# Patient Record
(demographics unavailable — no encounter records)

---

## 2025-04-14 NOTE — CONSULT LETTER
[Sincerely,] : Sincerely, [FreeTextEntry1] : Dear Dr. Chaparro,  I saw your patient Ryan Helm with his mother in the office today. He is about 3 weeks status post single port laparoscopic appendectomy for acute appendicitis.  He has done well postoperatively.  He is eating well and having normal bowel movements.  He has no fever or abdominal pain.  On examination today, Ryan appears well.  His abdomen is soft, nontender and nondistended.  The umbilical port site is healing well.  I expect that no further surgical intervention will be required. They will follow-up with you. I would of course be pleased to see Ryan back as needed.  Please let me know if I can be of any further assistance. [FreeTextEntry3] : Alphonso Ann

## 2025-04-14 NOTE — ASSESSMENT
[FreeTextEntry1] : Ryan is a 14-year-old boy comes into the office today with his mother.  He is about 3 weeks status post single port laparoscopic appendectomy for acute appendicitis.  He has done well postoperatively.  He is eating well and having normal bowel movements.  He has no fever or abdominal pain.  On examination today, Ryan appears well.  His abdomen is soft, nontender and nondistended.  The umbilical port site is healing well.  I expect that no further surgical intervention will be required.  They will follow-up their pediatrician and return here as needed.